# Patient Record
Sex: MALE | Race: WHITE | URBAN - METROPOLITAN AREA
[De-identification: names, ages, dates, MRNs, and addresses within clinical notes are randomized per-mention and may not be internally consistent; named-entity substitution may affect disease eponyms.]

---

## 2017-05-25 ENCOUNTER — OFFICE VISIT (OUTPATIENT)
Dept: FAMILY MEDICINE CLINIC | Facility: CLINIC | Age: 22
End: 2017-05-25

## 2017-05-25 VITALS
WEIGHT: 194 LBS | TEMPERATURE: 98 F | HEART RATE: 59 BPM | SYSTOLIC BLOOD PRESSURE: 112 MMHG | BODY MASS INDEX: 26 KG/M2 | DIASTOLIC BLOOD PRESSURE: 72 MMHG

## 2017-05-25 DIAGNOSIS — M54.50 LOW BACK PAIN WITHOUT SCIATICA, UNSPECIFIED BACK PAIN LATERALITY, UNSPECIFIED CHRONICITY: Primary | ICD-10-CM

## 2017-05-25 PROCEDURE — 99213 OFFICE O/P EST LOW 20 MIN: CPT | Performed by: FAMILY MEDICINE

## 2017-05-25 PROCEDURE — 81002 URINALYSIS NONAUTO W/O SCOPE: CPT | Performed by: FAMILY MEDICINE

## 2017-05-25 PROCEDURE — 98925 OSTEOPATH MANJ 1-2 REGIONS: CPT | Performed by: FAMILY MEDICINE

## 2017-05-25 NOTE — PROGRESS NOTES
HPI:    Patient ID: Colt Wilson is a 25year old male. HPI  Patient presents with:  Low Back Pain  Over the past month. General ache. advil and heat give temporary help. Review of Systems   Constitutional: Negative.     Musculoskeletal: Positive

## 2017-06-02 ENCOUNTER — OFFICE VISIT (OUTPATIENT)
Dept: FAMILY MEDICINE CLINIC | Facility: CLINIC | Age: 22
End: 2017-06-02

## 2017-06-02 VITALS
BODY MASS INDEX: 26 KG/M2 | WEIGHT: 194 LBS | TEMPERATURE: 98 F | SYSTOLIC BLOOD PRESSURE: 122 MMHG | HEART RATE: 61 BPM | DIASTOLIC BLOOD PRESSURE: 76 MMHG

## 2017-06-02 DIAGNOSIS — N43.40 SPERMATOCELE OF EPIDIDYMIS: Primary | ICD-10-CM

## 2017-06-02 PROCEDURE — 99213 OFFICE O/P EST LOW 20 MIN: CPT | Performed by: FAMILY MEDICINE

## 2017-06-02 PROCEDURE — 99212 OFFICE O/P EST SF 10 MIN: CPT | Performed by: FAMILY MEDICINE

## 2017-06-02 RX ORDER — CIPROFLOXACIN 500 MG/1
500 TABLET, FILM COATED ORAL 2 TIMES DAILY
Qty: 14 TABLET | Refills: 0 | Status: SHIPPED | OUTPATIENT
Start: 2017-06-02 | End: 2017-12-27 | Stop reason: ALTCHOICE

## 2017-06-02 NOTE — PROGRESS NOTES
HPI:    Patient ID: Sherly Mckinley is a 25year old male. HPI  Patient presents with:  Lump: lump on left testicle    Review of Systems   Genitourinary: Negative for scrotal swelling and testicular pain.         Lump near left testicle            Curr

## 2017-12-27 ENCOUNTER — OFFICE VISIT (OUTPATIENT)
Dept: FAMILY MEDICINE CLINIC | Facility: CLINIC | Age: 22
End: 2017-12-27

## 2017-12-27 VITALS
HEART RATE: 89 BPM | DIASTOLIC BLOOD PRESSURE: 60 MMHG | RESPIRATION RATE: 16 BRPM | BODY MASS INDEX: 28.16 KG/M2 | WEIGHT: 212.5 LBS | SYSTOLIC BLOOD PRESSURE: 104 MMHG | OXYGEN SATURATION: 99 % | HEIGHT: 73 IN | TEMPERATURE: 98 F

## 2017-12-27 DIAGNOSIS — H65.193 OTHER ACUTE NONSUPPURATIVE OTITIS MEDIA OF BOTH EARS, RECURRENCE NOT SPECIFIED: Primary | ICD-10-CM

## 2017-12-27 DIAGNOSIS — J00 ACUTE NASOPHARYNGITIS: ICD-10-CM

## 2017-12-27 PROCEDURE — 99202 OFFICE O/P NEW SF 15 MIN: CPT | Performed by: NURSE PRACTITIONER

## 2017-12-27 PROCEDURE — 87880 STREP A ASSAY W/OPTIC: CPT | Performed by: NURSE PRACTITIONER

## 2017-12-27 RX ORDER — AZITHROMYCIN 250 MG/1
TABLET, FILM COATED ORAL
Qty: 6 TABLET | Refills: 0 | Status: SHIPPED | OUTPATIENT
Start: 2017-12-27 | End: 2018-06-22

## 2017-12-27 NOTE — PROGRESS NOTES
CHIEF COMPLAINT:   Patient presents with:  Sore Throat        HPI:   Sherly Mckinley is a 25year old male presents to clinic with complaint of sore throat. Patient has had for 2 days.  Patient reports following associated symptoms: nasal congestion, or uvular deviation. NECK: supple, non-tender  LUNGS: clear to auscultation bilaterally; no wheezes, rales, or rhonchi. Breathing is non labored.   CARDIO: RRR without murmur  GI: good BS's, no masses, no hepatosplenomegaly, no tenderness on direct palpa infection, but ear infections are more common in children less than 6years old. How does it occur? Ear infections usually begin with a viral infection of the nose and throat. For example, a cold might lead to an infection of the ear.  Ear infections ma ibuprofen. Carefully follow the directions for using medicines, even if they are nonprescription. How long will the effects last?   In most cases you should feel better in 2 to 3 days.    If you are taking an antibiotic and your eardrum has not returned t worsening of your hearing   weakness of one side of your face. Keep all your appointments. Your healthcare provider may want you to have one or more follow-up exams until signs of inflammation and infection have disappeared.    How can I prevent an ear

## 2017-12-27 NOTE — PATIENT INSTRUCTIONS
· It is very important to complete full course of antibiotic.    · Rest and fluids  · Flonase as prescribed   · Acetaminophen or ibuprofen according to package instructions as needed for pain  · Call or return if symptoms worsen, do not improve in 3 days, o couple of days without antibiotics. Bacteria can become resistant to antibiotics, and the medicine may cause side effects.  For these reasons, your healthcare provider may wait 1 to 3 days to see if the symptoms go away on their own before prescribing an an acetaminophen, or ibuprofen to control your fever. Anyone under the age of 24 with a viral illness should not take aspirin because of the increased risk of Reye's syndrome. Keep a daily record of your temperature.    Call your healthcare provider if you h

## 2018-06-22 ENCOUNTER — OFFICE VISIT (OUTPATIENT)
Dept: FAMILY MEDICINE CLINIC | Facility: CLINIC | Age: 23
End: 2018-06-22

## 2018-06-22 VITALS
WEIGHT: 207 LBS | BODY MASS INDEX: 27 KG/M2 | DIASTOLIC BLOOD PRESSURE: 71 MMHG | HEART RATE: 58 BPM | TEMPERATURE: 98 F | SYSTOLIC BLOOD PRESSURE: 108 MMHG

## 2018-06-22 DIAGNOSIS — N43.40 SPERMATOCELE OF EPIDIDYMIS: Primary | ICD-10-CM

## 2018-06-22 PROCEDURE — 99213 OFFICE O/P EST LOW 20 MIN: CPT | Performed by: FAMILY MEDICINE

## 2018-06-22 PROCEDURE — 99212 OFFICE O/P EST SF 10 MIN: CPT | Performed by: FAMILY MEDICINE

## 2018-06-22 RX ORDER — CIPROFLOXACIN 500 MG/1
500 TABLET, FILM COATED ORAL 2 TIMES DAILY
Qty: 20 TABLET | Refills: 0 | Status: SHIPPED | OUTPATIENT
Start: 2018-06-22

## 2018-06-22 NOTE — PROGRESS NOTES
HPI:    Patient ID: Carmen Vazquez is a 21year old male. HPI  Patient presents with:  Groin/Pacer Site Check: groin pain with swelling on testicles    Review of Systems   Genitourinary: Positive for genital sores and testicular pain.  Negative for sc

## 2018-10-19 ENCOUNTER — APPOINTMENT (OUTPATIENT)
Dept: OTHER | Facility: HOSPITAL | Age: 23
End: 2018-10-19
Attending: EMERGENCY MEDICINE

## 2021-10-15 ENCOUNTER — OFFICE VISIT (OUTPATIENT)
Dept: FAMILY MEDICINE CLINIC | Facility: CLINIC | Age: 26
End: 2021-10-15
Payer: COMMERCIAL

## 2021-10-15 VITALS
HEART RATE: 66 BPM | SYSTOLIC BLOOD PRESSURE: 110 MMHG | BODY MASS INDEX: 25.71 KG/M2 | DIASTOLIC BLOOD PRESSURE: 70 MMHG | HEIGHT: 73 IN | WEIGHT: 194 LBS

## 2021-10-15 DIAGNOSIS — N50.812 TESTICULAR PAIN, LEFT: Primary | ICD-10-CM

## 2021-10-15 PROCEDURE — 3008F BODY MASS INDEX DOCD: CPT | Performed by: FAMILY MEDICINE

## 2021-10-15 PROCEDURE — 3078F DIAST BP <80 MM HG: CPT | Performed by: FAMILY MEDICINE

## 2021-10-15 PROCEDURE — 3074F SYST BP LT 130 MM HG: CPT | Performed by: FAMILY MEDICINE

## 2021-10-15 PROCEDURE — 99203 OFFICE O/P NEW LOW 30 MIN: CPT | Performed by: FAMILY MEDICINE

## 2021-10-15 RX ORDER — CIPROFLOXACIN 500 MG/1
500 TABLET, FILM COATED ORAL 2 TIMES DAILY
Qty: 14 TABLET | Refills: 0 | Status: SHIPPED | OUTPATIENT
Start: 2021-10-15

## 2021-10-15 NOTE — PROGRESS NOTES
Subjective:   Patient ID: Raven Daly is a 32year old male.     HPI  Patient presents with:  Groin Pain: pt presents with swollen testical which reoccures, discomfort some days more then others,  taking OTC advil   has been treated for spermatocoel i

## 2021-12-22 ENCOUNTER — HOSPITAL ENCOUNTER (OUTPATIENT)
Dept: ULTRASOUND IMAGING | Age: 26
Discharge: HOME | End: 2021-12-22
Attending: FAMILY MEDICINE
Payer: COMMERCIAL

## 2021-12-22 DIAGNOSIS — N50.812 TESTICULAR PAIN, LEFT: ICD-10-CM

## 2021-12-22 PROCEDURE — 93975 VASCULAR STUDY: CPT | Performed by: FAMILY MEDICINE

## 2021-12-22 PROCEDURE — 76870 US EXAM SCROTUM: CPT | Performed by: FAMILY MEDICINE

## 2022-05-31 ENCOUNTER — APPOINTMENT (OUTPATIENT)
Dept: URBAN - METROPOLITAN AREA CLINIC 246 | Age: 27
Setting detail: DERMATOLOGY
End: 2022-06-02

## 2022-05-31 DIAGNOSIS — L81.4 OTHER MELANIN HYPERPIGMENTATION: ICD-10-CM

## 2022-05-31 DIAGNOSIS — L82.0 INFLAMED SEBORRHEIC KERATOSIS: ICD-10-CM

## 2022-05-31 DIAGNOSIS — L82.1 OTHER SEBORRHEIC KERATOSIS: ICD-10-CM

## 2022-05-31 DIAGNOSIS — B07.8 OTHER VIRAL WARTS: ICD-10-CM

## 2022-05-31 DIAGNOSIS — B36.0 PITYRIASIS VERSICOLOR: ICD-10-CM

## 2022-05-31 DIAGNOSIS — D22 MELANOCYTIC NEVI: ICD-10-CM

## 2022-05-31 PROBLEM — D22.5 MELANOCYTIC NEVI OF TRUNK: Status: ACTIVE | Noted: 2022-05-31

## 2022-05-31 PROCEDURE — OTHER MIPS QUALITY: OTHER

## 2022-05-31 PROCEDURE — OTHER OTC TREATMENT REGIMEN: OTHER

## 2022-05-31 PROCEDURE — OTHER LIQUID NITROGEN: OTHER

## 2022-05-31 PROCEDURE — OTHER FOLLOW UP FOR NEXT VISIT: OTHER

## 2022-05-31 PROCEDURE — OTHER COUNSELING: OTHER

## 2022-05-31 PROCEDURE — 99213 OFFICE O/P EST LOW 20 MIN: CPT | Mod: 25

## 2022-05-31 PROCEDURE — 17110 DESTRUCT B9 LESION 1-14: CPT

## 2022-05-31 ASSESSMENT — LOCATION SIMPLE DESCRIPTION DERM
LOCATION SIMPLE: CHEST
LOCATION SIMPLE: UPPER BACK
LOCATION SIMPLE: LEFT UPPER BACK
LOCATION SIMPLE: RIGHT FOREARM
LOCATION SIMPLE: LOWER BACK

## 2022-05-31 ASSESSMENT — LOCATION DETAILED DESCRIPTION DERM
LOCATION DETAILED: RIGHT VENTRAL PROXIMAL FOREARM
LOCATION DETAILED: LEFT LATERAL SUPERIOR CHEST
LOCATION DETAILED: SUPERIOR LUMBAR SPINE
LOCATION DETAILED: INFERIOR THORACIC SPINE
LOCATION DETAILED: LEFT SUPERIOR MEDIAL UPPER BACK

## 2022-05-31 ASSESSMENT — LOCATION ZONE DERM
LOCATION ZONE: TRUNK
LOCATION ZONE: ARM

## 2022-05-31 NOTE — PROCEDURE: COUNSELING
Patient Specific Counseling (Will Not Stick From Patient To Patient): Patient can call if he needs refill on medication.
Detail Level: Detailed
Detail Level: Generalized

## 2022-05-31 NOTE — PROCEDURE: LIQUID NITROGEN
Spray Paint Technique: No
Consent: The patient's consent was obtained including but not limited to risks of crusting, scabbing, blistering, scarring, darker or lighter pigmentary change, recurrence, incomplete removal and infection.
Post-Care Instructions: I reviewed with the patient in detail post-care instructions. Patient is to wear sunprotection, and avoid picking at any of the treated lesions. Pt may apply Vaseline to crusted or scabbing areas.
Show Topical Anesthesia Variable?: Yes
Detail Level: Detailed
Spray Paint Text: The liquid nitrogen was applied to the skin utilizing a spray paint frosting technique.
Medical Necessity Information: It is in your best interest to select a reason for this procedure from the list below. All of these items fulfill various CMS LCD requirements except the new and changing color options.
Medical Necessity Clause: This procedure was medically necessary because the lesions that were treated were:

## 2024-08-30 ENCOUNTER — OFFICE VISIT (OUTPATIENT)
Dept: SURGERY | Facility: CLINIC | Age: 29
End: 2024-08-30
Payer: COMMERCIAL

## 2024-08-30 DIAGNOSIS — N50.82 SCROTAL PAIN: Primary | ICD-10-CM

## 2024-08-30 PROCEDURE — 99204 OFFICE O/P NEW MOD 45 MIN: CPT | Performed by: PHYSICIAN ASSISTANT

## 2024-08-30 RX ORDER — SULFAMETHOXAZOLE/TRIMETHOPRIM 800-160 MG
1 TABLET ORAL 2 TIMES DAILY
Qty: 28 TABLET | Refills: 0 | Status: SHIPPED | OUTPATIENT
Start: 2024-08-30 | End: 2024-09-13

## 2024-08-30 NOTE — PATIENT INSTRUCTIONS
I recommend he drink plenty of fluids and try prn NSAIDs, wear an athletic supporter and try to avoid activities which exacerbate pain such as prolonged sitting or heavy lifting, try hot baths/hot packs.

## 2024-08-30 NOTE — PROGRESS NOTES
[facility]  Initial Office Consultation    HPI:   Carlo Esparza is a 29 year old male here today who was last seen by his PCP on 10/15/2021 for groin pain/swollen testicle which is recurrent.  The patient is taking over-the-counter Advil and in the past was treated for spermatocele.  At that visit he was advised to take Cipro 2 times daily for total of 7 days.  The patient had a ultrasound of the scrotum done in December 2021 which showed no evidence of testicular torsion, normal testicles, small left hydrocele, and left epididymitis.  He presents today for new patient visit for left testicular pain. He reports that it has never really gone away. He reports Advil helps a little bit. Does not remember not feeling symptoms.   No dysuria, no hematuria, no fever, no chills.   He reports the pain is 5/10.   Works in sales, sells commercial kitchen parts. Is sedentary most of the time at work. He does exercise but does not heavy lift.   Past Medical History:    Gastritis    History of nasal septoplasty     Past Surgical History:   Procedure Laterality Date    Upper gi endoscopy,exam  2014     Family History   Problem Relation Age of Onset    Colon Cancer Paternal Grandfather     Neurological Disorder Maternal Grandmother         Parkinsons     Social History     Socioeconomic History    Marital status: Single   Tobacco Use    Smoking status: Never    Smokeless tobacco: Never   Vaping Use    Vaping status: Never Used   Substance and Sexual Activity    Alcohol use: Yes     Alcohol/week: 1.7 standard drinks of alcohol     Types: 2 Cans of beer per week     Comment: every weekend    Drug use: No   Other Topics Concern    Caffeine Concern No     Current Outpatient Medications   Medication Sig Dispense Refill    sulfamethoxazole-trimethoprim DS (BACTRIM DS) 800-160 MG Oral Tab per tablet Take 1 tablet by mouth 2 (two) times daily for 14 days. 28 tablet 0    ciprofloxacin 500 MG Oral Tab Take 1 tablet (500 mg total)  by mouth 2 (two) times daily. 14 tablet 0    Ciprofloxacin HCl 500 MG Oral Tab Take 1 tablet (500 mg total) by mouth 2 (two) times daily. (Patient not taking: Reported on 10/15/2021) 20 tablet 0       Allergies: Penicillins    REVIEW OF SYSTEMS:  A comprehensive 10 point review of systems was completed.  Pertinent positives and negatives noted in the the HPI.       EXAM:  There were no vitals taken for this visit.    Physical Exam  Constitutional:       Appearance: Normal appearance.   HENT:      Head: Normocephalic and atraumatic.   Eyes:      General: No scleral icterus.     Conjunctiva/sclera: Conjunctivae normal.   Pulmonary:      Effort: Pulmonary effort is normal.   Genitourinary:     Comments: Small pea sized cyst palpable on the head of the left epididymis.   Skin:     General: Skin is warm and dry.   Neurological:      General: No focal deficit present.      Mental Status: He is alert and oriented to person, place, and time.   Psychiatric:         Mood and Affect: Mood normal.         Behavior: Behavior normal.          LABS:  No results found for: \"PSA\", \"QPSA\", \"TOTPSASCREEN\"  Lab Results   Component Value Date    WBC 4.3 07/02/2015    RBC 5.10 07/02/2015    HGB 15.4 07/02/2015    HCT 45.3 07/02/2015    MCV 88.7 07/02/2015    MCH 30.1 07/02/2015    MCHC 33.9 07/02/2015    RDW 13.5 07/02/2015     (L) 07/02/2015    MPV 9.4 07/02/2015     Lab Results   Component Value Date    GLU 89 07/02/2015    BUN 15 07/02/2015    BUNCREA 14.3 07/02/2015    CREATSERUM 1.05 07/02/2015    ANIONGAP 10 07/02/2015    GFR >60 07/02/2015    GFRNAA >60 07/02/2015    GFRAA >60 07/02/2015    CA 9.9 07/02/2015     07/02/2015    K 3.9 07/02/2015    CL 99 07/02/2015    CO2 28 07/02/2015     No results found for: \"PTP\", \"PT\", \"INR\"    IMAGING:  No results found.    IMPRESSION:  Pleasant 29-year-old male with history of chronic left scrotal pain.  This been going on for several years.  Last scrotal ultrasound in 2021.   Patient reports that he does not really recall a time when he did not have pain or discomfort.  Finds the pain to be annoying.  No fever no chills no urinary symptoms.  Discussed a 2-week course of antibiotics.  Advised that he complete scrotal ultrasound before starting antibiotics.  Will follow-up with the patient once scrotal ultrasound results return.    We discussed supportive care.  Discussed icing, elevating scrotum, wearing supportive underwear, or jockstrap.  Discussed over-the-counter NSAIDs.  Information regarding epididymitis and scrotal support provided the patient.  PLAN:  1) scrotal ultrasound  2) start antibiotics, bactrim x 2 weeks (after he completes US). Discussed taking probiotic while on abx, or eating yogurt daily.   3 supportive care    Sherin Beatty PA-C  8/30/2024 10:53 AM

## 2024-10-21 ENCOUNTER — TELEPHONE (OUTPATIENT)
Dept: SURGERY | Facility: CLINIC | Age: 29
End: 2024-10-21

## 2025-01-16 RX ORDER — CIPROFLOXACIN 500 MG/1
500 TABLET, FILM COATED ORAL 2 TIMES DAILY
Qty: 14 TABLET | Refills: 0 | OUTPATIENT
Start: 2025-01-16

## (undated) NOTE — MR AVS SNAPSHOT
[facility] SPECIALTY \[facility]\"" - [place] [address] [address]  [id number] [phone]               Thank you for choosing us for your health care visit with Zaid Atkins DO.   We are glad to serve you and happy to provide you with this summary of PROTEIN (URINE DIPSTICK) Negative Negative/Trace mg/dL    UROBILINOGEN,SEMI-QN 0.0 0.0 - 1.9 mg/dL    NITRITE, URINE Negative Negative    LEUKOCYTES Negative Negative    APPEARANCE Clear Clear    URINE-COLOR Yellow Yellow    [employer] Lot# [id number] Numeric

## (undated) NOTE — LETTER
10/21/24        Carlo Esparza  [address]  [facility] [address]      Dear Carlo,    Our records indicate that you have outstanding lab work and or testing that was ordered for you and has not yet been completed:  US ART DEL DUPLEX (ABD, PLV, OR SCROTAL) COMPLETE (CPT=93975)   To provide you with the best possible care, please complete these orders at your earliest convenience. If you have recently completed these orders please disregard this letter.     If you have any questions please call the office at Dept: [phone].     Thank you,       Sherin Beatty PA-C

## (undated) NOTE — MR AVS SNAPSHOT
[facility] SPECIALTY [facility] - [place] [address] [address] [id number] [phone]               Thank you for choosing us for your health care visit with Jodie Ramsay DO.   We are glad to serve you and happy to provide you with this summary of